# Patient Record
Sex: FEMALE | NOT HISPANIC OR LATINO | URBAN - METROPOLITAN AREA
[De-identification: names, ages, dates, MRNs, and addresses within clinical notes are randomized per-mention and may not be internally consistent; named-entity substitution may affect disease eponyms.]

---

## 2017-03-18 ENCOUNTER — EMERGENCY (EMERGENCY)
Facility: HOSPITAL | Age: 37
LOS: 1 days | Discharge: HOME | End: 2017-03-18
Attending: EMERGENCY MEDICINE | Admitting: EMERGENCY MEDICINE
Payer: COMMERCIAL

## 2017-03-18 VITALS
SYSTOLIC BLOOD PRESSURE: 109 MMHG | HEIGHT: 65 IN | WEIGHT: 164.91 LBS | RESPIRATION RATE: 15 BRPM | HEART RATE: 61 BPM | TEMPERATURE: 98 F | DIASTOLIC BLOOD PRESSURE: 74 MMHG | OXYGEN SATURATION: 100 %

## 2017-03-18 DIAGNOSIS — S61.011A LACERATION WITHOUT FOREIGN BODY OF RIGHT THUMB WITHOUT DAMAGE TO NAIL, INITIAL ENCOUNTER: ICD-10-CM
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
## 2017-03-18 PROCEDURE — 90471 IMMUNIZATION ADMIN: CPT

## 2017-03-18 PROCEDURE — 99283 EMERGENCY DEPT VISIT LOW MDM: CPT | Mod: 25

## 2017-03-18 PROCEDURE — 12001 RPR S/N/AX/GEN/TRNK 2.5CM/<: CPT | Mod: F5

## 2017-03-18 PROCEDURE — 90715 TDAP VACCINE 7 YRS/> IM: CPT

## 2017-03-18 PROCEDURE — 12001 RPR S/N/AX/GEN/TRNK 2.5CM/<: CPT

## 2017-03-18 RX ORDER — TETANUS TOXOID, REDUCED DIPHTHERIA TOXOID AND ACELLULAR PERTUSSIS VACCINE, ADSORBED 5; 2.5; 8; 8; 2.5 [IU]/.5ML; [IU]/.5ML; UG/.5ML; UG/.5ML; UG/.5ML
0.5 SUSPENSION INTRAMUSCULAR ONCE
Qty: 0 | Refills: 0 | Status: COMPLETED | OUTPATIENT
Start: 2017-03-18 | End: 2017-03-18

## 2017-03-18 RX ADMIN — TETANUS TOXOID, REDUCED DIPHTHERIA TOXOID AND ACELLULAR PERTUSSIS VACCINE, ADSORBED 0.5 MILLILITER(S): 5; 2.5; 8; 8; 2.5 SUSPENSION INTRAMUSCULAR at 20:01

## 2017-03-18 RX ADMIN — Medication 1 TABLET(S): at 20:00

## 2017-03-18 NOTE — ED PROVIDER NOTE - PROGRESS NOTE DETAILS
wound care, laceration sutured right thumb 5.0 x 5, dsd applied, advised suture removal in 10 days, follow up with pmd, return to ed if any signs of infection

## 2017-03-18 NOTE — ED PROVIDER NOTE - ATTENDING CONTRIBUTION TO CARE
I have personally performed a face to face diagnostic evaluation on this patient.  I have reviewed the PA note and agree with the history, exam, and plan of care, except as noted.  History and Exam by me shows  right thumb lac from opening a can today. pe- right thumb- lac 1.5 cm, no neuro deficit.

## 2017-03-18 NOTE — ED PROVIDER NOTE - MEDICAL DECISION MAKING DETAILS
right thumb laceration, wound care, suture , tdap, po abx x 1 dose, suture removal in 10 days, follow up with pmd

## 2017-03-18 NOTE — ED PROCEDURE NOTE - CPROC ED POST PROC CARE GUIDE1
Instructed patient/caregiver regarding signs and symptoms of infection./Keep the cast/splint/dressing clean and dry./Instructed patient/caregiver to follow-up with primary care physician./Verbal/written post procedure instructions were given to patient/caregiver.

## 2017-03-31 ENCOUNTER — EMERGENCY (EMERGENCY)
Facility: HOSPITAL | Age: 37
LOS: 1 days | Discharge: HOME | End: 2017-03-31
Attending: EMERGENCY MEDICINE | Admitting: EMERGENCY MEDICINE
Payer: COMMERCIAL

## 2017-03-31 VITALS
HEART RATE: 66 BPM | RESPIRATION RATE: 14 BRPM | WEIGHT: 160.06 LBS | SYSTOLIC BLOOD PRESSURE: 110 MMHG | DIASTOLIC BLOOD PRESSURE: 70 MMHG | TEMPERATURE: 98 F | OXYGEN SATURATION: 99 % | HEIGHT: 66 IN

## 2017-03-31 DIAGNOSIS — Z48.02 ENCOUNTER FOR REMOVAL OF SUTURES: ICD-10-CM

## 2017-03-31 PROCEDURE — G0463: CPT

## 2017-03-31 NOTE — ED ADULT NURSE NOTE - OBJECTIVE STATEMENT
Pt alert and oriented, came to ED with need for suture removal, no s/s of infection, site clean and dry

## 2017-03-31 NOTE — ED PROVIDER NOTE - CARE PLAN
Principal Discharge DX:	Suture check  Instructions for follow-up, activity and diet:	Return to the ED for any new or worsening symptoms  Take your medication as prescribed  Follow up with your PMD in 3-4 days for a recheck   Keep site clean and dry  Apply Bacitracin 2 times a day

## 2017-03-31 NOTE — ED PROVIDER NOTE - PLAN OF CARE
Return to the ED for any new or worsening symptoms  Take your medication as prescribed  Follow up with your PMD in 3-4 days for a recheck   Keep site clean and dry  Apply Bacitracin 2 times a day

## 2017-03-31 NOTE — ED PROCEDURE NOTE - CPROC ED POST PROC CARE GUIDE1
Instructed patient/caregiver to follow-up with primary care physician./Instructed patient/caregiver regarding signs and symptoms of infection./Keep the cast/splint/dressing clean and dry./Verbal/written post procedure instructions were given to patient/caregiver.

## 2017-03-31 NOTE — ED PROVIDER NOTE - PROGRESS NOTE DETAILS
Pt tolerated suture removal well, no evidence of infection, laceration well healed.  Bacitracin applied. See procedure note.  Pt stable for discharge home all questions answered.

## 2017-03-31 NOTE — ED PROVIDER NOTE - SKIN, MLM
right 1st digit distal palmar aspect 1.5 cm laceration well healed with 5 sutures in place,  no evidence of cellulitis, no drainage from site, no increased warmth, sensation intact +radial pulse capillary refill less then 2 seconds

## 2017-03-31 NOTE — ED PROVIDER NOTE - CHPI ED SYMPTOMS NEG
no drainage/no inflammation/no redness/no fever/no bleeding/no red streaks/no pain/no purulent drainage/no chills

## 2017-03-31 NOTE — ED PROVIDER NOTE - OBJECTIVE STATEMENT
Pt is a 35 yo female who presents to the ED with a cc of suture removal.  Pt reports that on March 18th she sustained a laceration to her 1st digit palmar aspect while opening a can.  She was seen in the ED and 5 sutures were placed.  Pt presents for suture removal.  Reports laceration has been healing well.  Denies pain to site.  Denies redness or drainage from site. Denies fever, chills, numbness or tingling in hand.  Pt is right hand dominant.  Tetanus is UTD
